# Patient Record
(demographics unavailable — no encounter records)

---

## 2025-02-02 NOTE — PHYSICAL EXAM
[No Acute Distress] : no acute distress [Normal Oropharynx] : normal oropharynx [Normal Appearance] : normal appearance [No Neck Mass] : no neck mass [Normal Rate/Rhythm] : normal rate/rhythm [Murmur ___ / 6] : murmur [unfilled] / 6 [Normal S1, S2] : normal s1, s2 [No Resp Distress] : no resp distress [No Abnormalities] : no abnormalities [Benign] : benign [Normal Gait] : normal gait [No Clubbing] : no clubbing [No Cyanosis] : no cyanosis [No Edema] : no edema [FROM] : FROM [Normal Color/ Pigmentation] : normal color/ pigmentation [No Focal Deficits] : no focal deficits [Oriented x3] : oriented x3 [Normal Affect] : normal affect [TextBox_68] : decreased BS bilaterally

## 2025-02-02 NOTE — HISTORY OF PRESENT ILLNESS
[Former] : former [>= 20 pack years] : >= 20 pack years [TextBox_4] : 73-year-old male with history of COPD presents follow-up.  Patient is "okay" on twice daily Symbicort with daily Incruse and occasional albuterol use.  He also uses supplemental oxygen most of the time.  His breathing overall has improved as per the patient's son who was present throughout after mitral valve repair at [facility]. [TextBox_11] : 1 [TextBox_13] : 50 [YearQuit] : 2020 [TextBox_29] : Denies snoring, daytime somnolence, apneic episodes, AM headaches

## 2025-02-02 NOTE — DISCUSSION/SUMMARY
[FreeTextEntry1] : 73-year-old male with history of COPD at baseline.  Patient is continue use of Symbicort Incruse and albuterol with supplemental oxygen as before.  Follow-up chest CT will be performed in May of this year.  He is follow-up with cardiology and his PMD as before.  His son was present throughout.

## 2025-02-02 NOTE — HISTORY OF PRESENT ILLNESS
[Former] : former [>= 20 pack years] : >= 20 pack years [TextBox_4] : 73-year-old male with history of COPD presents follow-up.  Patient is "okay" on twice daily Symbicort with daily Incruse and occasional albuterol use.  He also uses supplemental oxygen most of the time.  His breathing overall has improved as per the patient's son who was present throughout after mitral valve repair at [facility]. [TextBox_13] : 50 [TextBox_11] : 1 [YearQuit] : 2020 [TextBox_29] : Denies snoring, daytime somnolence, apneic episodes, AM headaches

## 2025-05-27 NOTE — DISCUSSION/SUMMARY
[FreeTextEntry1] : 73-year-old male with history of COPD and abnormal chest CT clinically at baseline.  I reviewed the chest CT images online and discussed the findings with both the patient and his son who was present throughout.  PET/CT will be performed. Further intervention will depend on the findings.  He is to continue his inhaled meds and supplemental oxygen as before.  Follow-up with his PMD was also recommended.

## 2025-05-27 NOTE — HISTORY OF PRESENT ILLNESS
[Former] : former [>= 20 pack years] : >= 20 pack years [TextBox_4] : 73-year-old male with history of COPD and abnormal chest CT, presents for follow-up of recent chest CT results.  Patient continues complain of occasional productive cough without fever, chills, chest pain, hemoptysis, night sweats or weight loss.  Continues to use Symbicort twice daily as well as albuterol having ran out of Incruse a few days ago.  He also continues to use supplemental oxygen at all times. [TextBox_13] : 50 [YearQuit] : 2020 [TextBox_29] : Denies snoring, daytime somnolence, apneic episodes, AM headaches